# Patient Record
Sex: FEMALE | Race: WHITE | URBAN - METROPOLITAN AREA
[De-identification: names, ages, dates, MRNs, and addresses within clinical notes are randomized per-mention and may not be internally consistent; named-entity substitution may affect disease eponyms.]

---

## 2018-01-15 ENCOUNTER — OFFICE VISIT (OUTPATIENT)
Dept: FAMILY MEDICINE CLINIC | Facility: CLINIC | Age: 23
End: 2018-01-15

## 2018-01-15 VITALS
OXYGEN SATURATION: 98 % | HEART RATE: 92 BPM | SYSTOLIC BLOOD PRESSURE: 100 MMHG | RESPIRATION RATE: 14 BRPM | TEMPERATURE: 98 F | BODY MASS INDEX: 22.2 KG/M2 | HEIGHT: 64 IN | DIASTOLIC BLOOD PRESSURE: 70 MMHG | WEIGHT: 130 LBS

## 2018-01-15 DIAGNOSIS — J02.9 SORE THROAT: Primary | ICD-10-CM

## 2018-01-15 DIAGNOSIS — R68.89 FLU-LIKE SYMPTOMS: ICD-10-CM

## 2018-01-15 LAB
CONTROL LINE PRESENT WITH A CLEAR BACKGROUND (YES/NO): YES YES/NO
STREP GRP A CUL-SCR: NEGATIVE

## 2018-01-15 PROCEDURE — 87081 CULTURE SCREEN ONLY: CPT | Performed by: PHYSICIAN ASSISTANT

## 2018-01-15 PROCEDURE — 99202 OFFICE O/P NEW SF 15 MIN: CPT | Performed by: PHYSICIAN ASSISTANT

## 2018-01-15 PROCEDURE — 87880 STREP A ASSAY W/OPTIC: CPT | Performed by: PHYSICIAN ASSISTANT

## 2018-01-15 RX ORDER — GUAIFENESIN 600 MG
TABLET, EXTENDED RELEASE 12 HR ORAL
Refills: 0 | COMMUNITY
Start: 2018-01-11

## 2018-01-15 RX ORDER — OSELTAMIVIR PHOSPHATE 75 MG/1
75 CAPSULE ORAL 2 TIMES DAILY
Qty: 10 CAPSULE | Refills: 0 | Status: SHIPPED | OUTPATIENT
Start: 2018-01-15 | End: 2018-01-20

## 2018-01-15 RX ORDER — AMOXICILLIN AND CLAVULANATE POTASSIUM 875; 125 MG/1; MG/1
TABLET, FILM COATED ORAL
COMMUNITY
Start: 2018-01-11

## 2018-01-15 RX ORDER — IBUPROFEN 600 MG/1
TABLET ORAL
COMMUNITY
Start: 2018-01-11

## 2018-01-15 RX ORDER — FLUTICASONE PROPIONATE 50 MCG
SPRAY, SUSPENSION (ML) NASAL
COMMUNITY
Start: 2018-01-11

## 2018-01-16 NOTE — PATIENT INSTRUCTIONS
Please follow up with your PCP if no improvement within 5-7 days. Go directly to the ER for any acute worsening of symptoms.    RX for Recovery from Your Viral Upper Respiratory Illness  Prepared for: Drew Acosta  Date: 1/15/2018    Take the medications( Chlorpheniramine (Chlor-Trimeton, Coricidin HBP): Take for the next 3-5 days; follow the package directions especially if you have high blood pressure. Avoid Sudafed products if you have high blood pressure.     For overall congestion relief:  Drink more

## 2018-01-16 NOTE — PROGRESS NOTES
CHIEF COMPLAINT:   \" flu symptoms \"-sinus, was on Augmentin for sore throat since 1/11 only took for 2 days, seen at [facility], pressure in ears, sore throat, body aches x 3 days.  abdominal pain due to antibitoic. chills, headache, dry cough, nasa /70   Pulse 92   Temp 98.1 °F (36.7 °C) (Oral)   Resp 14   Ht 64\"   Wt 130 lb   SpO2 98%   BMI 22.31 kg/m²   GENERAL: well developed, well nourished.   Pt is ill-appearing, but non-toxic appearing  SKIN: no rashes,no suspicious lesions  HEAD: atrauma The patient indicates understanding of these issues and agrees to the plan. The patient is asked to follow up with PCP in one week if sx's persist or worsen.     Meds & Refills for this Visit:  Signed Prescriptions Disp Refills    Oseltamivir Phosphate (TA Phenylephrine (Sudafed PE - nondrowsy): Take for the next 3-5 days; follow the package directions. Saline Nasal spray:  Follow the package directions to help clear your nose and make it feel less dry.     For sneezing, watery/itchy eyes, runny nose:

## 2021-03-16 ENCOUNTER — APPOINTMENT (OUTPATIENT)
Dept: URGENT CARE | Age: 26
End: 2021-03-16

## 2021-03-16 ENCOUNTER — WALK IN (OUTPATIENT)
Dept: URGENT CARE | Age: 26
End: 2021-03-16

## 2021-10-06 ENCOUNTER — IMAGING SERVICES (OUTPATIENT)
Dept: OTHER | Age: 26
End: 2021-10-06
Attending: EMERGENCY MEDICINE

## 2021-10-06 ENCOUNTER — TELEPHONE (OUTPATIENT)
Dept: SPORTS MEDICINE | Age: 26
End: 2021-10-06

## 2021-10-07 ENCOUNTER — IMAGING SERVICES (OUTPATIENT)
Dept: GENERAL RADIOLOGY | Age: 26
End: 2021-10-07
Attending: ORTHOPAEDIC SURGERY

## 2021-10-07 ENCOUNTER — OFFICE VISIT (OUTPATIENT)
Dept: ORTHOPEDICS | Age: 26
End: 2021-10-07

## 2021-10-07 DIAGNOSIS — S52.502A CLOSED FRACTURE OF DISTAL END OF LEFT RADIUS, UNSPECIFIED FRACTURE MORPHOLOGY, INITIAL ENCOUNTER: Primary | ICD-10-CM

## 2021-10-07 DIAGNOSIS — S52.502A CLOSED FRACTURE OF DISTAL END OF LEFT RADIUS, UNSPECIFIED FRACTURE MORPHOLOGY, INITIAL ENCOUNTER: ICD-10-CM

## 2021-10-07 PROCEDURE — 25605 CLTX DST RDL FX/EPHYS SEP W/: CPT | Performed by: ORTHOPAEDIC SURGERY

## 2021-10-07 PROCEDURE — 73110 X-RAY EXAM OF WRIST: CPT | Performed by: RADIOLOGY

## 2021-10-07 PROCEDURE — 99204 OFFICE O/P NEW MOD 45 MIN: CPT | Performed by: ORTHOPAEDIC SURGERY

## 2021-10-28 ENCOUNTER — IMAGING SERVICES (OUTPATIENT)
Dept: GENERAL RADIOLOGY | Age: 26
End: 2021-10-28
Attending: ORTHOPAEDIC SURGERY

## 2021-10-28 ENCOUNTER — OFFICE VISIT (OUTPATIENT)
Dept: ORTHOPEDICS | Age: 26
End: 2021-10-28

## 2021-10-28 DIAGNOSIS — S52.202D CLOSED FRACTURE OF SHAFT OF LEFT RADIUS WITH ULNA WITH ROUTINE HEALING, SUBSEQUENT ENCOUNTER: Primary | ICD-10-CM

## 2021-10-28 DIAGNOSIS — S52.302D CLOSED FRACTURE OF SHAFT OF LEFT RADIUS WITH ULNA WITH ROUTINE HEALING, SUBSEQUENT ENCOUNTER: Primary | ICD-10-CM

## 2021-10-28 DIAGNOSIS — S52.202D CLOSED FRACTURE OF SHAFT OF LEFT RADIUS WITH ULNA WITH ROUTINE HEALING, SUBSEQUENT ENCOUNTER: ICD-10-CM

## 2021-10-28 DIAGNOSIS — S52.302D CLOSED FRACTURE OF SHAFT OF LEFT RADIUS WITH ULNA WITH ROUTINE HEALING, SUBSEQUENT ENCOUNTER: ICD-10-CM

## 2021-10-28 PROCEDURE — 73110 X-RAY EXAM OF WRIST: CPT | Performed by: ORTHOPAEDIC SURGERY

## 2021-10-28 PROCEDURE — L3908 WHO COCK-UP NONMOLDE PRE OTS: HCPCS | Performed by: ORTHOPAEDIC SURGERY

## 2021-10-28 PROCEDURE — 99024 POSTOP FOLLOW-UP VISIT: CPT | Performed by: ORTHOPAEDIC SURGERY

## 2021-11-23 ENCOUNTER — WALK IN (OUTPATIENT)
Dept: URGENT CARE | Age: 26
End: 2021-11-23

## 2021-11-23 VITALS
HEART RATE: 108 BPM | RESPIRATION RATE: 18 BRPM | DIASTOLIC BLOOD PRESSURE: 86 MMHG | TEMPERATURE: 97.4 F | SYSTOLIC BLOOD PRESSURE: 126 MMHG | OXYGEN SATURATION: 100 %

## 2021-11-23 DIAGNOSIS — Z20.822 SUSPECTED COVID-19 VIRUS INFECTION: Primary | ICD-10-CM

## 2021-11-23 DIAGNOSIS — Z76.89 ENCOUNTER TO ESTABLISH CARE: ICD-10-CM

## 2021-11-23 DIAGNOSIS — J06.9 ACUTE UPPER RESPIRATORY INFECTION, UNSPECIFIED: ICD-10-CM

## 2021-11-23 DIAGNOSIS — J02.9 SORE THROAT: ICD-10-CM

## 2021-11-23 LAB
INTERNAL PROCEDURAL CONTROLS ACCEPTABLE: YES
S PYO AG THROAT QL IA.RAPID: NEGATIVE
SARS-COV+SARS-COV-2 AG RESP QL IA.RAPID: NOT DETECTED

## 2021-11-23 PROCEDURE — 87880 STREP A ASSAY W/OPTIC: CPT | Performed by: INTERNAL MEDICINE

## 2021-11-23 PROCEDURE — 99214 OFFICE O/P EST MOD 30 MIN: CPT | Performed by: INTERNAL MEDICINE

## 2021-11-23 PROCEDURE — 87426 SARSCOV CORONAVIRUS AG IA: CPT | Performed by: INTERNAL MEDICINE

## 2021-11-23 RX ORDER — AMOXICILLIN 875 MG/1
875 TABLET, COATED ORAL 2 TIMES DAILY
Qty: 20 TABLET | Refills: 0 | Status: SHIPPED | OUTPATIENT
Start: 2021-11-23 | End: 2021-12-03

## 2021-12-02 ENCOUNTER — APPOINTMENT (OUTPATIENT)
Dept: GENERAL RADIOLOGY | Age: 26
End: 2021-12-02
Attending: ORTHOPAEDIC SURGERY

## 2021-12-17 ENCOUNTER — TELEPHONE (OUTPATIENT)
Dept: FAMILY MEDICINE | Age: 26
End: 2021-12-17

## 2022-01-12 ENCOUNTER — WALK IN (OUTPATIENT)
Dept: URGENT CARE | Age: 27
End: 2022-01-12

## 2022-01-12 VITALS
HEART RATE: 111 BPM | RESPIRATION RATE: 18 BRPM | OXYGEN SATURATION: 98 % | SYSTOLIC BLOOD PRESSURE: 139 MMHG | DIASTOLIC BLOOD PRESSURE: 82 MMHG

## 2022-01-12 DIAGNOSIS — R30.0 DYSURIA: ICD-10-CM

## 2022-01-12 DIAGNOSIS — N39.0 URINARY TRACT INFECTION WITHOUT HEMATURIA, SITE UNSPECIFIED: Primary | ICD-10-CM

## 2022-01-12 DIAGNOSIS — Z00.00 ENCOUNTER FOR MEDICAL EXAMINATION TO ESTABLISH CARE: ICD-10-CM

## 2022-01-12 DIAGNOSIS — J02.9 SORE THROAT: ICD-10-CM

## 2022-01-12 LAB
BILIRUBIN URINE: NEGATIVE
BLOOD URINE: NEGATIVE
CLARITY: ABNORMAL
COLOR: YELLOW
GLUCOSE QUALITATIVE U: NEGATIVE
INTERNAL PROCEDURAL CONTROLS ACCEPTABLE: YES
KETONES, URINE: NEGATIVE
LEUKOCYTE ESTERASE URINE: ABNORMAL
MUCOUS: ABNORMAL
NITRITE URINE: NEGATIVE
PH URINE: 5 (ref 5–7)
RED BLOOD CELLS URINE: ABNORMAL (ref 0–3)
S PYO AG THROAT QL IA.RAPID: NEGATIVE
SARS-COV+SARS-COV-2 AG RESP QL IA.RAPID: NOT DETECTED
SPECIFIC GRAVITY URINE: 1.02 (ref 1–1.03)
SQUAMOUS EPITHELIAL CELLS: ABNORMAL
URINE PROTEIN, QUAL (DIPSTICK): 30
UROBILINOGEN URINE: <2
WHITE BLOOD CELLS URINE: >100 (ref 0–5)

## 2022-01-12 PROCEDURE — 99204 OFFICE O/P NEW MOD 45 MIN: CPT | Performed by: FAMILY MEDICINE

## 2022-01-12 PROCEDURE — 87880 STREP A ASSAY W/OPTIC: CPT | Performed by: INTERNAL MEDICINE

## 2022-01-12 PROCEDURE — 87086 URINE CULTURE/COLONY COUNT: CPT | Performed by: INTERNAL MEDICINE

## 2022-01-12 PROCEDURE — 87088 URINE BACTERIA CULTURE: CPT | Performed by: INTERNAL MEDICINE

## 2022-01-12 PROCEDURE — 87186 SC STD MICRODIL/AGAR DIL: CPT | Performed by: INTERNAL MEDICINE

## 2022-01-12 PROCEDURE — 81003 URINALYSIS AUTO W/O SCOPE: CPT | Performed by: INTERNAL MEDICINE

## 2022-01-12 PROCEDURE — 87426 SARSCOV CORONAVIRUS AG IA: CPT | Performed by: INTERNAL MEDICINE

## 2022-01-12 RX ORDER — NITROFURANTOIN 25; 75 MG/1; MG/1
100 CAPSULE ORAL 2 TIMES DAILY
Qty: 10 CAPSULE | Refills: 0 | Status: SHIPPED | OUTPATIENT
Start: 2022-01-12 | End: 2022-01-17

## 2022-01-14 LAB — FINAL REPORT: ABNORMAL

## 2022-01-14 RX ORDER — SULFAMETHOXAZOLE AND TRIMETHOPRIM 800; 160 MG/1; MG/1
1 TABLET ORAL 2 TIMES DAILY
Qty: 10 TABLET | Refills: 0 | Status: SHIPPED | OUTPATIENT
Start: 2022-01-14 | End: 2022-01-19

## 2022-09-14 ENCOUNTER — WALK IN (OUTPATIENT)
Dept: URGENT CARE | Age: 27
End: 2022-09-14

## 2022-09-14 VITALS
RESPIRATION RATE: 16 BRPM | HEART RATE: 80 BPM | TEMPERATURE: 99 F | SYSTOLIC BLOOD PRESSURE: 106 MMHG | OXYGEN SATURATION: 97 % | DIASTOLIC BLOOD PRESSURE: 70 MMHG

## 2022-09-14 DIAGNOSIS — Z20.822 SUSPECTED COVID-19 VIRUS INFECTION: Primary | ICD-10-CM

## 2022-09-14 DIAGNOSIS — Z00.00 ENCOUNTER FOR MEDICAL EXAMINATION TO ESTABLISH CARE: ICD-10-CM

## 2022-09-14 DIAGNOSIS — J02.9 SORE THROAT: ICD-10-CM

## 2022-09-14 LAB
INTERNAL PROCEDURAL CONTROLS ACCEPTABLE: YES
INTERNAL PROCEDURAL CONTROLS ACCEPTABLE: YES
S PYO AG THROAT QL IA.RAPID: NEGATIVE
SARS-COV+SARS-COV-2 AG RESP QL IA.RAPID: NOT DETECTED
TEST LOT EXPIRATION DATE: NORMAL
TEST LOT EXPIRATION DATE: NORMAL
TEST LOT NUMBER: NORMAL
TEST LOT NUMBER: NORMAL

## 2022-09-14 PROCEDURE — 99214 OFFICE O/P EST MOD 30 MIN: CPT | Performed by: INTERNAL MEDICINE

## 2022-09-14 PROCEDURE — 87426 SARSCOV CORONAVIRUS AG IA: CPT | Performed by: INTERNAL MEDICINE

## 2022-09-14 PROCEDURE — 87880 STREP A ASSAY W/OPTIC: CPT | Performed by: INTERNAL MEDICINE

## 2022-09-14 ASSESSMENT — PAIN SCALES - GENERAL: PAINLEVEL: 7

## 2022-11-08 ENCOUNTER — WALK IN (OUTPATIENT)
Dept: URGENT CARE | Age: 27
End: 2022-11-08

## 2022-11-08 VITALS
DIASTOLIC BLOOD PRESSURE: 68 MMHG | TEMPERATURE: 96.8 F | OXYGEN SATURATION: 98 % | HEART RATE: 68 BPM | SYSTOLIC BLOOD PRESSURE: 112 MMHG | RESPIRATION RATE: 20 BRPM

## 2022-11-08 DIAGNOSIS — R30.0 DYSURIA: ICD-10-CM

## 2022-11-08 DIAGNOSIS — N30.00 ACUTE CYSTITIS WITHOUT HEMATURIA: Primary | ICD-10-CM

## 2022-11-08 LAB
APPEARANCE UR: ABNORMAL
BACTERIA #/AREA URNS HPF: ABNORMAL /HPF
BILIRUB UR QL STRIP: NEGATIVE
COLOR UR: YELLOW
GLUCOSE UR STRIP-MCNC: NEGATIVE MG/DL
HGB UR QL STRIP: NEGATIVE
HYALINE CASTS #/AREA URNS LPF: ABNORMAL /LPF
KETONES UR STRIP-MCNC: NEGATIVE MG/DL
LEUKOCYTE ESTERASE UR QL STRIP: ABNORMAL
MUCOUS THREADS URNS QL MICRO: PRESENT
NITRITE UR QL STRIP: NEGATIVE
PH UR STRIP: 6 [PH] (ref 5–7)
PROT UR STRIP-MCNC: NEGATIVE MG/DL
RBC #/AREA URNS HPF: ABNORMAL /HPF
SP GR UR STRIP: 1.02 (ref 1–1.03)
SQUAMOUS #/AREA URNS HPF: ABNORMAL /HPF
UROBILINOGEN UR STRIP-MCNC: 0.2 MG/DL
WBC #/AREA URNS HPF: ABNORMAL /HPF

## 2022-11-08 PROCEDURE — 99214 OFFICE O/P EST MOD 30 MIN: CPT | Performed by: FAMILY MEDICINE

## 2022-11-08 PROCEDURE — 81001 URINALYSIS AUTO W/SCOPE: CPT | Performed by: INTERNAL MEDICINE

## 2022-11-08 PROCEDURE — 87086 URINE CULTURE/COLONY COUNT: CPT | Performed by: INTERNAL MEDICINE

## 2022-11-08 RX ORDER — CEPHALEXIN 500 MG/1
500 CAPSULE ORAL 2 TIMES DAILY
Qty: 14 CAPSULE | Refills: 0 | Status: SHIPPED | OUTPATIENT
Start: 2022-11-08 | End: 2022-11-15

## 2022-11-08 ASSESSMENT — ENCOUNTER SYMPTOMS
CONSTIPATION: 0
SHORTNESS OF BREATH: 0
CHILLS: 0
FATIGUE: 0
FEVER: 0
DIARRHEA: 0
SWEATS: 0
NAUSEA: 0
COUGH: 0
VOMITING: 0
ABDOMINAL PAIN: 0

## 2022-11-09 LAB — BACTERIA UR CULT: NORMAL

## 2023-02-07 ENCOUNTER — WALK IN (OUTPATIENT)
Dept: URGENT CARE | Age: 28
End: 2023-02-07

## 2023-02-07 VITALS
OXYGEN SATURATION: 98 % | DIASTOLIC BLOOD PRESSURE: 78 MMHG | HEART RATE: 89 BPM | SYSTOLIC BLOOD PRESSURE: 110 MMHG | TEMPERATURE: 96.9 F | RESPIRATION RATE: 18 BRPM

## 2023-02-07 DIAGNOSIS — J02.9 SORE THROAT: Primary | ICD-10-CM

## 2023-02-07 LAB
INTERNAL PROCEDURAL CONTROLS ACCEPTABLE: YES
S PYO AG THROAT QL IA.RAPID: POSITIVE
TEST LOT EXPIRATION DATE: ABNORMAL
TEST LOT NUMBER: ABNORMAL

## 2023-02-07 PROCEDURE — 87880 STREP A ASSAY W/OPTIC: CPT | Performed by: FAMILY MEDICINE

## 2023-02-07 PROCEDURE — 99214 OFFICE O/P EST MOD 30 MIN: CPT | Performed by: FAMILY MEDICINE

## 2023-02-07 RX ORDER — AMOXICILLIN AND CLAVULANATE POTASSIUM 875; 125 MG/1; MG/1
1 TABLET, FILM COATED ORAL 2 TIMES DAILY
Qty: 20 TABLET | Refills: 0 | Status: SHIPPED | OUTPATIENT
Start: 2023-02-07 | End: 2023-02-17

## 2023-02-07 RX ORDER — PREDNISONE 50 MG/1
50 TABLET ORAL DAILY
Qty: 5 TABLET | Refills: 0 | Status: SHIPPED | OUTPATIENT
Start: 2023-02-07 | End: 2023-02-12

## 2023-02-07 ASSESSMENT — PAIN SCALES - GENERAL: PAINLEVEL: 7

## 2023-12-14 ENCOUNTER — WALK IN (OUTPATIENT)
Dept: URGENT CARE | Age: 28
End: 2023-12-14

## 2023-12-14 VITALS
HEART RATE: 84 BPM | TEMPERATURE: 97.9 F | OXYGEN SATURATION: 100 % | SYSTOLIC BLOOD PRESSURE: 108 MMHG | DIASTOLIC BLOOD PRESSURE: 70 MMHG | RESPIRATION RATE: 18 BRPM

## 2023-12-14 DIAGNOSIS — R30.0 DYSURIA: Primary | ICD-10-CM

## 2023-12-14 LAB
APPEARANCE UR: CLEAR
BACTERIA #/AREA URNS HPF: ABNORMAL /HPF
BILIRUB UR QL STRIP: NEGATIVE
COLOR UR: YELLOW
GLUCOSE UR STRIP-MCNC: NEGATIVE MG/DL
HGB UR QL STRIP: NEGATIVE
HYALINE CASTS #/AREA URNS LPF: ABNORMAL /LPF
KETONES UR STRIP-MCNC: ABNORMAL MG/DL
LEUKOCYTE ESTERASE UR QL STRIP: NEGATIVE
MUCOUS THREADS URNS QL MICRO: PRESENT
NITRITE UR QL STRIP: NEGATIVE
PH UR STRIP: 6 [PH] (ref 5–7)
PROT UR STRIP-MCNC: 30 MG/DL
RBC #/AREA URNS HPF: ABNORMAL /HPF
SP GR UR STRIP: >1.03 (ref 1–1.03)
SQUAMOUS #/AREA URNS HPF: ABNORMAL /HPF
UROBILINOGEN UR STRIP-MCNC: 0.2 MG/DL
WBC #/AREA URNS HPF: ABNORMAL /HPF

## 2023-12-14 PROCEDURE — 99214 OFFICE O/P EST MOD 30 MIN: CPT | Performed by: INTERNAL MEDICINE

## 2023-12-14 PROCEDURE — 81001 URINALYSIS AUTO W/SCOPE: CPT | Performed by: INTERNAL MEDICINE

## 2024-06-05 ENCOUNTER — WALK IN (OUTPATIENT)
Dept: URGENT CARE | Age: 29
End: 2024-06-05

## 2024-06-05 VITALS
DIASTOLIC BLOOD PRESSURE: 70 MMHG | SYSTOLIC BLOOD PRESSURE: 112 MMHG | OXYGEN SATURATION: 98 % | TEMPERATURE: 99.5 F | RESPIRATION RATE: 18 BRPM | HEART RATE: 112 BPM

## 2024-06-05 DIAGNOSIS — J02.9 SORE THROAT: Primary | ICD-10-CM

## 2024-06-05 DIAGNOSIS — J06.9 ACUTE UPPER RESPIRATORY INFECTION, UNSPECIFIED: ICD-10-CM

## 2024-06-05 DIAGNOSIS — Z20.822 SUSPECTED COVID-19 VIRUS INFECTION: ICD-10-CM

## 2024-06-05 LAB
FLUAV AG UPPER RESP QL IA.RAPID: NEGATIVE
FLUBV AG UPPER RESP QL IA.RAPID: NEGATIVE
INTERNAL PROCEDURAL CONTROLS ACCEPTABLE: YES
S PYO AG THROAT QL IA.RAPID: NEGATIVE
SARS-COV+SARS-COV-2 AG RESP QL IA.RAPID: NOT DETECTED
TEST LOT EXPIRATION DATE: NORMAL
TEST LOT EXPIRATION DATE: NORMAL
TEST LOT NUMBER: NORMAL
TEST LOT NUMBER: NORMAL

## 2024-06-05 PROCEDURE — 87428 SARSCOV & INF VIR A&B AG IA: CPT | Performed by: INTERNAL MEDICINE

## 2024-06-05 PROCEDURE — 99214 OFFICE O/P EST MOD 30 MIN: CPT | Performed by: INTERNAL MEDICINE

## 2024-06-05 PROCEDURE — 87880 STREP A ASSAY W/OPTIC: CPT | Performed by: INTERNAL MEDICINE

## 2024-06-05 RX ORDER — PREDNISONE 50 MG/1
50 TABLET ORAL DAILY
Qty: 3 TABLET | Refills: 0 | Status: SHIPPED | OUTPATIENT
Start: 2024-06-05 | End: 2024-06-08

## 2024-06-05 RX ORDER — AZITHROMYCIN 250 MG/1
500 TABLET, FILM COATED ORAL DAILY
Qty: 10 TABLET | Refills: 0 | Status: SHIPPED | OUTPATIENT
Start: 2024-06-05 | End: 2024-06-10